# Patient Record
Sex: FEMALE | Race: WHITE | Employment: STUDENT | URBAN - METROPOLITAN AREA
[De-identification: names, ages, dates, MRNs, and addresses within clinical notes are randomized per-mention and may not be internally consistent; named-entity substitution may affect disease eponyms.]

---
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
## 2023-10-06 ENCOUNTER — APPOINTMENT (OUTPATIENT)
Dept: URBAN - METROPOLITAN AREA SURGERY 12 | Age: 23
Setting detail: DERMATOLOGY
End: 2023-10-06

## 2023-10-06 DIAGNOSIS — L71.0 PERIORAL DERMATITIS: ICD-10-CM

## 2023-10-06 PROCEDURE — OTHER MIPS QUALITY: OTHER

## 2023-10-06 PROCEDURE — OTHER PRESCRIPTION: OTHER

## 2023-10-06 PROCEDURE — OTHER COUNSELING: OTHER

## 2023-10-06 PROCEDURE — 99213 OFFICE O/P EST LOW 20 MIN: CPT

## 2023-10-06 RX ORDER — DOXYCYCLINE HYCLATE 100 MG/1
CAPSULE, GELATIN COATED ORAL
Qty: 84 | Refills: 0 | Status: ERX | COMMUNITY
Start: 2023-10-06

## 2023-10-06 RX ORDER — TACROLIMUS 1 MG/G
OINTMENT TOPICAL
Qty: 30 | Refills: 2 | Status: ERX | COMMUNITY
Start: 2023-10-06

## 2023-10-06 NOTE — PROCEDURE: COUNSELING
Patient Specific Counseling (Will Not Stick From Patient To Patient): Pt to begin doxy 100mg BID for 6 weeks. Advised pt to take doxy with food \\nAnd will begin tacrolimus ointment applying thin layer to aa BID. Perioral dermatitis discussed in length today. Gentle washes and moisturizers recommended. Pt goes to school in [place], advised to f/u with provider there if condition persists/worsens.
Detail Level: Detailed

## 2023-10-06 NOTE — HPI: OTHER
Condition:: Area of concern
Please Describe Your Condition:: Pt presents with new rash that has been present for the past 2-3 months. To reports moving recently but has otherwise made no changes in lifestyle or products she uses. Rash is prominent in the mornings around the eyes and mouth. OTC moisturizers do not alleviate symptoms but she notes rash usually subsides by the end of the day.